# Patient Record
Sex: MALE | Race: BLACK OR AFRICAN AMERICAN | Employment: UNEMPLOYED | URBAN - METROPOLITAN AREA
[De-identification: names, ages, dates, MRNs, and addresses within clinical notes are randomized per-mention and may not be internally consistent; named-entity substitution may affect disease eponyms.]

---
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
## 2022-02-22 ENCOUNTER — HOSPITAL ENCOUNTER (EMERGENCY)
Age: 22
Discharge: HOME | End: 2022-02-22
Attending: EMERGENCY MEDICINE
Payer: COMMERCIAL

## 2022-02-22 ENCOUNTER — APPOINTMENT (OUTPATIENT)
Dept: GENERAL RADIOLOGY | Age: 22
End: 2022-02-22
Attending: EMERGENCY MEDICINE
Payer: COMMERCIAL

## 2022-02-22 VITALS
HEART RATE: 81 BPM | RESPIRATION RATE: 14 BRPM | OXYGEN SATURATION: 97 % | WEIGHT: 140 LBS | BODY MASS INDEX: 23.9 KG/M2 | TEMPERATURE: 98.5 F | DIASTOLIC BLOOD PRESSURE: 70 MMHG | SYSTOLIC BLOOD PRESSURE: 115 MMHG | HEIGHT: 64 IN

## 2022-02-22 DIAGNOSIS — F41.1 ANXIETY STATE: ICD-10-CM

## 2022-02-22 DIAGNOSIS — R00.2 PALPITATIONS: Primary | ICD-10-CM

## 2022-02-22 DIAGNOSIS — R06.02 SOB (SHORTNESS OF BREATH): ICD-10-CM

## 2022-02-22 DIAGNOSIS — R07.89 ATYPICAL CHEST PAIN: ICD-10-CM

## 2022-02-22 PROCEDURE — 71045 X-RAY EXAM CHEST 1 VIEW: CPT

## 2022-02-22 PROCEDURE — 99283 EMERGENCY DEPT VISIT LOW MDM: CPT

## 2022-02-22 PROCEDURE — 93005 ELECTROCARDIOGRAM TRACING: CPT

## 2022-02-22 NOTE — ED TRIAGE NOTES
Patient presents to the ED with c/o intermittent rapid heart rate and chest tightness x \"awhile. \" denies it currently. Pt also complains of lower back pain x a couple days. Denies injury. Stated \"its sometimes hard to push out urine. \" denies kidney issues. LBM was PTA. Denies abdominal pain. Denies N/V/D. Emergency Department Nursing Plan of Care       The Nursing Plan of Care is developed from the Nursing assessment and Emergency Department Attending provider initial evaluation. The plan of care may be reviewed in the ED Provider note.     The Plan of Care was developed with the following considerations:   Patient / Family readiness to learn indicated by:verbalized understanding  Persons(s) to be included in education: patient  Barriers to Learning/Limitations:No    Signed     Lonnie Love    2/22/2022   2:35 AM

## 2022-02-22 NOTE — ED PROVIDER NOTES
EMERGENCY DEPARTMENT HISTORY AND PHYSICAL EXAM      Date: 2022  Patient Name: Marion Concepcion    History of Presenting Illness     Chief Complaint   Patient presents with    Rapid Heart Rate    Back Pain       History Provided By: Patient    HPI: Marion Concepcion, 23 y.o. male with history of anxiety and depression presents to the ED with cc of multiple complaints. Patient initially complaining of palpitations and chest tightness that onset about an hour prior to arrival.  It started while he was playing video games at home and persisted until he came to the emergency department at which time it resolved. He also endorses a bleeding generalized abdominal discomfort and states that he feels like he is constipated even though he has had multiple bowel movements today. He endorses a sensation of shortness of breath and a pain in his lower back. Also endorses concerned that symptoms is difficult for him to urinate but he has not had any difficulty today. He does have history of anxiety and does not follow with anybody regarding this or take any medications. He states that he has tried to focus on his breathing. He does not believe that his symptoms today are consistent with anxiety. He denies any alcohol use, drug use specifically cocaine, or marijuana use. There are no other complaints, changes, or physical findings at this time. PCP: None    No current facility-administered medications on file prior to encounter. No current outpatient medications on file prior to encounter.        Past History     Past Medical History:  Past Medical History:   Diagnosis Date    ADHD (attention deficit hyperactivity disorder) 2019    Depression     Second hand smoke exposure        Past Surgical History:  Past Surgical History:   Procedure Laterality Date    HX CIRCUMCISION          HX OTHER SURGICAL      Laceration repair, left leg, [facility], 10 yrs old       Family History:  Family History   Problem Relation Age of Onset    Anxiety Mother     Depression Mother     Seizures Father     Diabetes Paternal Grandmother     Hypertension Paternal Grandmother     Hypertension Paternal Great Grandmother     Depression Maternal Grandmother     No Known Problems Brother     Heart Disease Maternal Grandfather     Emphysema Paternal Grandfather        Social History:  Social History     Tobacco Use    Smoking status: Passive Smoke Exposure - Never Smoker    Smokeless tobacco: Never Used   Substance Use Topics    Alcohol use: Never    Drug use: Never       Allergies:  No Known Allergies      Review of Systems   Review of Systems   Constitutional: Negative for chills and fever. Eyes: Negative for visual disturbance. Respiratory: Positive for chest tightness and shortness of breath. Negative for cough. Cardiovascular: Positive for chest pain and palpitations. Negative for leg swelling. Gastrointestinal: Positive for abdominal pain. Negative for constipation, diarrhea, nausea and vomiting. Genitourinary: Negative for dysuria. Musculoskeletal: Positive for back pain. Negative for gait problem. Skin: Negative for color change and rash. Neurological: Negative for dizziness, weakness, light-headedness and headaches. Psychiatric/Behavioral: The patient is nervous/anxious. All other systems reviewed and are negative. Physical Exam   Physical Exam  Vitals and nursing note reviewed. Constitutional:       General: He is not in acute distress. Appearance: Normal appearance. He is not ill-appearing or toxic-appearing. Comments: Sitting on side of bed in no acute distress. Ambulatory in ED without difficulty. HENT:      Head: Normocephalic and atraumatic. Nose: Nose normal.      Mouth/Throat:      Mouth: Mucous membranes are moist.   Eyes:      Extraocular Movements: Extraocular movements intact. Pupils: Pupils are equal, round, and reactive to light.    Cardiovascular: Rate and Rhythm: Normal rate and regular rhythm. Heart sounds: No murmur heard. Pulmonary:      Effort: Pulmonary effort is normal. No respiratory distress. Breath sounds: Normal breath sounds. No wheezing. Abdominal:      General: There is no distension. Palpations: Abdomen is soft. Tenderness: There is no abdominal tenderness. There is no guarding or rebound. Musculoskeletal:         General: No swelling or tenderness. Normal range of motion. Cervical back: Normal range of motion and neck supple. Right lower leg: No edema. Left lower leg: No edema. Skin:     General: Skin is warm and dry. Coloration: Skin is not pale. Findings: No erythema. Neurological:      General: No focal deficit present. Mental Status: He is alert and oriented to person, place, and time. Diagnostic Study Results     Labs -  Labs Reviewed   CBC WITH AUTOMATED DIFF   METABOLIC PANEL, COMPREHENSIVE   TSH 3RD GENERATION   D DIMER   TROPONIN-HIGH SENSITIVITY       Radiologic Studies -   XR CHEST PORT   Final Result      No acute process on portable chest. No change. CT Results  (Last 48 hours)    None        CXR Results  (Last 48 hours)               02/22/22 0310  XR CHEST PORT Final result    Impression:      No acute process on portable chest. No change. Narrative:  EXAM: XR CHEST PORT       INDICATION: Cardiac palpitations. COMPARISON: Portable chest on 12/5/2021 and 12/1/2021       TECHNIQUE: Upright portable chest AP view       FINDINGS: The cardiomediastinal and hilar contours are within normal limits. The   pulmonary vasculature is within normal limits. The lungs and pleural spaces are clear. The visualized bones and upper abdomen   are age-appropriate. Medical Decision Making   I am the first provider for this patient.     I reviewed the vital signs, available nursing notes, past medical history, past surgical history, family history and social history. Vital Signs-Reviewed the patient's vital signs. Visit Vitals  /70 (BP 1 Location: Right arm, BP Patient Position: At rest)   Pulse 81   Temp 98.5 °F (36.9 °C)   Resp 14   Ht 5' 4\" (1.626 m)   Wt 63.5 kg (140 lb)   SpO2 97%   BMI 24.03 kg/m²       Records Reviewed: Nursing Notes    Provider Notes (Medical Decision Making):   82-year-old male presenting to the emergency department with multiple complaints. Primary complaint is palpitations and chest tightness that onset earlier this evening while he was playing video games. He is afebrile and vital signs are stable without hypoxia or increased work of breathing. Exam is largely unremarkable. He also has complaints of generalized abdominal bloating, concern for constipation, back pain, and shortness of breath. He has multiple complaints and has been evaluated for these multiple complaints multiple ED encounters in the past few months that have been attributed to anxiety, however patient does not feel it is his anxiety today. Differential diagnosis includes anxiety, palpitations, arrhythmia, PE, pleurisy, constipation, IBS, colitis. Abdomen is soft, benign, nontender, and nonperitoneal and I do not feel he requires CT imaging at this time. Will correlate his symptoms with D-dimer, troponin, EKG, chest x-ray, TSH, CBC/BMP, and reassess. Offered Ativan which patient did not accept. ED Course:   Initial assessment performed. The patients presenting problems have been discussed, and they are in agreement with the care plan formulated and outlined with them. I have encouraged them to ask questions as they arise throughout their visit. ED Course as of 02/23/22 1051   Tue Feb 22, 2022   0324 EKG per my interpretation normal sinus rhythm, rate 92 bpm, normal axis, no acute ischemic changes or interval changes. Jeff Kaiser   [id number] Patient came out of exam room requesting to be discharged home.   I reevaluated patient and he states that he is no longer having symptoms would like to be discharged home. I discussed with him utility of laboratory evaluation and chest x-ray but patient does not want to stay for this part of the evaluation. He knows he can return at any time if symptoms are worsening, he is currently asymptomatic. Given strict return precautions and all questions answered. [AK]      ED Course User Index  [AK] Daksha Cuadra MD       Disposition:  Patient left ED prior to completion of workup. DISCHARGE PLAN:  1. There are no discharge medications for this patient. 2.   Follow-up Information     Follow up With Specialties Details Why [address] Internal Medicine Schedule an appointment as soon as possible for a visit  to establish with a PCP [place] [id number] [phone]    [facility] EMERGENCY DEPT Emergency Medicine Go to As needed, If symptoms worsen [address]        3. Return to ED if worse     Diagnosis     Clinical Impression:   1. Palpitations    2. Atypical chest pain    3. SOB (shortness of breath)    4. Anxiety state        Attestations:  I am the first and primary provider of record for this patient's ED encounter. I personally performed the services described above in this documentation. Traci Horton MD    Please note that this dictation was completed with [employer], the computer voice recognition software. Quite often unanticipated grammatical, syntax, homophones, and other interpretive errors are inadvertently transcribed by the computer software. Please disregard these errors. Please excuse any errors that have escaped final proofreading. Thank you.

## 2022-02-22 NOTE — ED NOTES
Pt informed this writer that he wanted to leave. MD Bert Burrows made aware. Pt not elaborating on why he wants to leave or why he came into ED. Bert Burrows attempted to inform pt on risks of leaving and discuss treatment plan with patient and pt's only response was that he wanted to go home. Ambulatory out of ED in [facility].

## 2022-02-23 LAB
ATRIAL RATE: 92 BPM
CALCULATED P AXIS, ECG09: 80 DEGREES
CALCULATED R AXIS, ECG10: 56 DEGREES
CALCULATED T AXIS, ECG11: 44 DEGREES
DIAGNOSIS, 93000: NORMAL
P-R INTERVAL, ECG05: 120 MS
Q-T INTERVAL, ECG07: 350 MS
QRS DURATION, ECG06: 84 MS
QTC CALCULATION (BEZET), ECG08: 432 MS
VENTRICULAR RATE, ECG03: 92 BPM

## 2023-02-17 ENCOUNTER — HOSPITAL ENCOUNTER (EMERGENCY)
Age: 23
Discharge: HOME | End: 2023-02-18
Attending: EMERGENCY MEDICINE
Payer: COMMERCIAL

## 2023-02-17 ENCOUNTER — APPOINTMENT (OUTPATIENT)
Dept: GENERAL RADIOLOGY | Age: 23
End: 2023-02-17
Attending: EMERGENCY MEDICINE
Payer: COMMERCIAL

## 2023-02-17 VITALS
OXYGEN SATURATION: 100 % | TEMPERATURE: 97.6 F | BODY MASS INDEX: 22.29 KG/M2 | SYSTOLIC BLOOD PRESSURE: 139 MMHG | WEIGHT: 142 LBS | DIASTOLIC BLOOD PRESSURE: 68 MMHG | HEIGHT: 67 IN | HEART RATE: 99 BPM | RESPIRATION RATE: 20 BRPM

## 2023-02-17 DIAGNOSIS — R07.9 ACUTE CHEST PAIN: Primary | ICD-10-CM

## 2023-02-17 DIAGNOSIS — F41.1 ANXIETY STATE: ICD-10-CM

## 2023-02-17 DIAGNOSIS — K21.9 GASTROESOPHAGEAL REFLUX DISEASE WITHOUT ESOPHAGITIS: ICD-10-CM

## 2023-02-17 DIAGNOSIS — R07.89 ATYPICAL CHEST PAIN: ICD-10-CM

## 2023-02-17 LAB
ATRIAL RATE: 79 BPM
ATRIAL RATE: 90 BPM
BASOPHILS # BLD: 0.1 K/UL (ref 0–0.1)
BASOPHILS NFR BLD: 2 % (ref 0–1)
CALCULATED P AXIS, ECG09: 105 DEGREES
CALCULATED P AXIS, ECG09: 44 DEGREES
CALCULATED R AXIS, ECG10: 57 DEGREES
CALCULATED R AXIS, ECG10: 78 DEGREES
CALCULATED T AXIS, ECG11: 3 DEGREES
CALCULATED T AXIS, ECG11: 55 DEGREES
COMMENT, HOLDF: NORMAL
DIAGNOSIS, 93000: NORMAL
DIAGNOSIS, 93000: NORMAL
DIFFERENTIAL METHOD BLD: ABNORMAL
EOSINOPHIL # BLD: 0.2 K/UL (ref 0–0.4)
EOSINOPHIL NFR BLD: 3 % (ref 0–7)
ERYTHROCYTE [DISTWIDTH] IN BLOOD BY AUTOMATED COUNT: 11.5 % (ref 11.5–14.5)
HCT VFR BLD AUTO: 43.2 % (ref 36.6–50.3)
HGB BLD-MCNC: 14.4 G/DL (ref 12.1–17)
IMM GRANULOCYTES # BLD AUTO: 0 K/UL (ref 0–0.04)
IMM GRANULOCYTES NFR BLD AUTO: 0 % (ref 0–0.5)
LYMPHOCYTES # BLD: 2.2 K/UL (ref 0.8–3.5)
LYMPHOCYTES NFR BLD: 37 % (ref 12–49)
MCH RBC QN AUTO: 31 PG (ref 26–34)
MCHC RBC AUTO-ENTMCNC: 33.3 G/DL (ref 30–36.5)
MCV RBC AUTO: 93.1 FL (ref 80–99)
MONOCYTES # BLD: 0.5 K/UL (ref 0–1)
MONOCYTES NFR BLD: 9 % (ref 5–13)
NEUTS SEG # BLD: 3.1 K/UL (ref 1.8–8)
NEUTS SEG NFR BLD: 49 % (ref 32–75)
NRBC # BLD: 0 K/UL (ref 0–0.01)
NRBC BLD-RTO: 0 PER 100 WBC
P-R INTERVAL, ECG05: 118 MS
P-R INTERVAL, ECG05: 48 MS
PLATELET # BLD AUTO: 283 K/UL (ref 150–400)
PMV BLD AUTO: 10 FL (ref 8.9–12.9)
Q-T INTERVAL, ECG07: 324 MS
Q-T INTERVAL, ECG07: 348 MS
QRS DURATION, ECG06: 138 MS
QRS DURATION, ECG06: 90 MS
QTC CALCULATION (BEZET), ECG08: 396 MS
QTC CALCULATION (BEZET), ECG08: 399 MS
RBC # BLD AUTO: 4.64 M/UL (ref 4.1–5.7)
SAMPLES BEING HELD,HOLD: NORMAL
VENTRICULAR RATE, ECG03: 79 BPM
VENTRICULAR RATE, ECG03: 90 BPM
WBC # BLD AUTO: 6.1 K/UL (ref 4.1–11.1)

## 2023-02-17 PROCEDURE — 80053 COMPREHEN METABOLIC PANEL: CPT

## 2023-02-17 PROCEDURE — 80307 DRUG TEST PRSMV CHEM ANLYZR: CPT

## 2023-02-17 PROCEDURE — 93005 ELECTROCARDIOGRAM TRACING: CPT

## 2023-02-17 PROCEDURE — 74011250636 HC RX REV CODE- 250/636: Performed by: EMERGENCY MEDICINE

## 2023-02-17 PROCEDURE — 71045 X-RAY EXAM CHEST 1 VIEW: CPT

## 2023-02-17 PROCEDURE — 84484 ASSAY OF TROPONIN QUANT: CPT

## 2023-02-17 PROCEDURE — 99285 EMERGENCY DEPT VISIT HI MDM: CPT

## 2023-02-17 PROCEDURE — 83690 ASSAY OF LIPASE: CPT

## 2023-02-17 PROCEDURE — 85025 COMPLETE CBC W/AUTO DIFF WBC: CPT

## 2023-02-17 PROCEDURE — 96374 THER/PROPH/DIAG INJ IV PUSH: CPT

## 2023-02-17 RX ORDER — KETOROLAC TROMETHAMINE 30 MG/ML
15 INJECTION, SOLUTION INTRAMUSCULAR; INTRAVENOUS
Status: COMPLETED | OUTPATIENT
Start: 2023-02-17 | End: 2023-02-17

## 2023-02-17 RX ADMIN — KETOROLAC TROMETHAMINE 15 MG: 30 INJECTION, SOLUTION INTRAMUSCULAR; INTRAVENOUS at 23:44

## 2023-02-18 LAB
ALBUMIN SERPL-MCNC: 4.4 G/DL (ref 3.5–5)
ALBUMIN/GLOB SERPL: 1.2 (ref 1.1–2.2)
ALP SERPL-CCNC: 65 U/L (ref 45–117)
ALT SERPL-CCNC: 13 U/L (ref 12–78)
AMPHET UR QL SCN: NEGATIVE
ANION GAP SERPL CALC-SCNC: 8 MMOL/L (ref 5–15)
AST SERPL-CCNC: 12 U/L (ref 15–37)
BARBITURATES UR QL SCN: NEGATIVE
BENZODIAZ UR QL: NEGATIVE
BILIRUB SERPL-MCNC: 0.5 MG/DL (ref 0.2–1)
BUN SERPL-MCNC: 11 MG/DL (ref 6–20)
BUN/CREAT SERPL: 10 (ref 12–20)
CALCIUM SERPL-MCNC: 9 MG/DL (ref 8.5–10.1)
CANNABINOIDS UR QL SCN: NEGATIVE
CHLORIDE SERPL-SCNC: 102 MMOL/L (ref 97–108)
CO2 SERPL-SCNC: 29 MMOL/L (ref 21–32)
COCAINE UR QL SCN: NEGATIVE
CREAT SERPL-MCNC: 1.1 MG/DL (ref 0.7–1.3)
DRUG SCRN COMMENT,DRGCM: NORMAL
GLOBULIN SER CALC-MCNC: 3.6 G/DL (ref 2–4)
GLUCOSE SERPL-MCNC: 92 MG/DL (ref 65–100)
LIPASE SERPL-CCNC: 64 U/L (ref 73–393)
METHADONE UR QL: NEGATIVE
OPIATES UR QL: NEGATIVE
PCP UR QL: NEGATIVE
POTASSIUM SERPL-SCNC: 4.1 MMOL/L (ref 3.5–5.1)
PROT SERPL-MCNC: 8 G/DL (ref 6.4–8.2)
SODIUM SERPL-SCNC: 139 MMOL/L (ref 136–145)
TROPONIN I SERPL HS-MCNC: <4 NG/L (ref 0–76)

## 2023-02-18 RX ORDER — HYDROXYZINE 25 MG/1
25 TABLET, FILM COATED ORAL
Qty: 10 TABLET | Refills: 0 | Status: SHIPPED | OUTPATIENT
Start: 2023-02-18 | End: 2023-02-28

## 2023-02-18 RX ORDER — FAMOTIDINE 20 MG/1
20 TABLET, FILM COATED ORAL 2 TIMES DAILY
Qty: 20 TABLET | Refills: 0 | Status: SHIPPED | OUTPATIENT
Start: 2023-02-18 | End: 2023-02-28

## 2023-02-18 RX ORDER — NAPROXEN 500 MG/1
500 TABLET ORAL 2 TIMES DAILY WITH MEALS
Qty: 20 TABLET | Refills: 0 | Status: SHIPPED | OUTPATIENT
Start: 2023-02-18

## 2023-02-18 NOTE — DISCHARGE INSTRUCTIONS
Thank You! It was a pleasure taking care of you in our Emergency Department today. We know that when you come to [address] [place] [address], you are entrusting us with your health, comfort, and safety. Our physicians and nurses honor that trust, and truly appreciate the opportunity to care for you and your loved ones. We also value your feedback. If you receive a survey about your Emergency Department experience today, please fill it out. We care about our patients' feedback, and we listen to what you have to say. Thank you. Dr. Katharina Castro M.D.      ____________________________________________________________________  I have included a copy of your lab results and/or radiologic studies from today's visit so you can have them easily available at your follow-up visit. We hope you feel better and please do not hesitate to contact the ED if you have any questions at all! Recent Results (from the past 12 hour(s))   EKG, 12 LEAD, INITIAL    Collection Time: 02/17/23 11:17 PM   Result Value Ref Range    Ventricular Rate 90 BPM    Atrial Rate 90 BPM    P-R Interval 118 ms    QRS Duration 90 ms    Q-T Interval 324 ms    QTC Calculation (Bezet) 396 ms    Calculated P Axis 105 degrees    Calculated R Axis 57 degrees    Calculated T Axis 3 degrees    Diagnosis       Normal sinus rhythm with sinus arrhythmia  Cannot rule out Inferior infarct , age undetermined  Lateral injury pattern  ** ** ACUTE MI / STEMI ** **  Abnormal ECG  When compared with ECG of 22-FEB-2022 03:17,  Minimal criteria for Inferior infarct are now present  ST now depressed in Inferior leads  ST no longer elevated in Lateral leads     SAMPLES BEING HELD    Collection Time: 02/17/23 11:35 PM   Result Value Ref Range    SAMPLES BEING HELD BLUE     COMMENT        Add-on orders for these samples will be processed based on acceptable specimen integrity and analyte stability, which may vary by analyte.    CBC WITH AUTOMATED DIFF Collection Time: 02/17/23 11:35 PM   Result Value Ref Range    WBC 6.1 4.1 - 11.1 K/uL    RBC 4.64 4.10 - 5.70 M/uL    HGB 14.4 12.1 - 17.0 g/dL    HCT 43.2 36.6 - 50.3 %    MCV 93.1 80.0 - 99.0 FL    MCH 31.0 26.0 - 34.0 PG    MCHC 33.3 30.0 - 36.5 g/dL    RDW 11.5 11.5 - 14.5 %    PLATELET 741 312 - 757 K/uL    MPV 10.0 8.9 - 12.9 FL    NRBC 0.0 0  WBC    ABSOLUTE NRBC 0.00 0.00 - 0.01 K/uL    NEUTROPHILS 49 32 - 75 %    LYMPHOCYTES 37 12 - 49 %    MONOCYTES 9 5 - 13 %    EOSINOPHILS 3 0 - 7 %    BASOPHILS 2 (H) 0 - 1 %    IMMATURE GRANULOCYTES 0 0.0 - 0.5 %    ABS. NEUTROPHILS 3.1 1.8 - 8.0 K/UL    ABS. LYMPHOCYTES 2.2 0.8 - 3.5 K/UL    ABS. MONOCYTES 0.5 0.0 - 1.0 K/UL    ABS. EOSINOPHILS 0.2 0.0 - 0.4 K/UL    ABS. BASOPHILS 0.1 0.0 - 0.1 K/UL    ABS. IMM. GRANS. 0.0 0.00 - 0.04 K/UL    DF AUTOMATED     METABOLIC PANEL, COMPREHENSIVE    Collection Time: 02/17/23 11:35 PM   Result Value Ref Range    Sodium 139 136 - 145 mmol/L    Potassium 4.1 3.5 - 5.1 mmol/L    Chloride 102 97 - 108 mmol/L    CO2 29 21 - 32 mmol/L    Anion gap 8 5 - 15 mmol/L    Glucose 92 65 - 100 mg/dL    BUN 11 6 - 20 MG/DL    Creatinine 1.10 0.70 - 1.30 MG/DL    BUN/Creatinine ratio 10 (L) 12 - 20      eGFR >60 >60 ml/min/1.73m2    Calcium 9.0 8.5 - 10.1 MG/DL    Bilirubin, total 0.5 0.2 - 1.0 MG/DL    ALT (SGPT) 13 12 - 78 U/L    AST (SGOT) 12 (L) 15 - 37 U/L    Alk.  phosphatase 65 45 - 117 U/L    Protein, total 8.0 6.4 - 8.2 g/dL    Albumin 4.4 3.5 - 5.0 g/dL    Globulin 3.6 2.0 - 4.0 g/dL    A-G Ratio 1.2 1.1 - 2.2     LIPASE    Collection Time: 02/17/23 11:35 PM   Result Value Ref Range    Lipase 64 (L) 73 - 393 U/L   TROPONIN-HIGH SENSITIVITY    Collection Time: 02/17/23 11:35 PM   Result Value Ref Range    Troponin-High Sensitivity <4 0 - 76 ng/L   EKG, 12 LEAD, SUBSEQUENT    Collection Time: 02/17/23 11:56 PM   Result Value Ref Range    Ventricular Rate 79 BPM    Atrial Rate 79 BPM    P-R Interval 48 ms    QRS Duration 138 ms Q-T Interval 348 ms    QTC Calculation (Bezet) 399 ms    Calculated P Axis 44 degrees    Calculated R Axis 78 degrees    Calculated T Axis 55 degrees    Diagnosis       Sinus rhythm with sinus arrhythmia with short MA  Nonspecific intraventricular block  Abnormal ECG  When compared with ECG of 17-FEB-2023 23:17,  MANUAL COMPARISON REQUIRED, DATA IS UNCONFIRMED         XR CHEST PORT   Final Result      No acute process on portable chest.           CT Results  (Last 48 hours)      None          The exam and treatment you received in the Emergency Department were for an urgent problem and are not intended as complete care. It is important that you follow up with a doctor, nurse practitioner, or physician assistant for ongoing care. If your symptoms become worse or you do not improve as expected and you are unable to reach your usual health care provider, you should return to the Emergency Department. We are available 24 hours a day. Please take your discharge instructions with you when you go to your follow-up appointment. If a prescription has been provided, please have it filled as soon as possible to prevent a delay in treatment. Read the entire medication instruction sheet provided to you by the pharmacy. If you have any questions or reservations about taking the medication due to side effects or interactions with other medications, please call your primary care physician or contact the ER to speak with the charge nurse. Please make an appointment with your family doctor or the physician you were referred to for follow-up of this visit as instructed on your discharge paperwork. Return to the ER if you are unable to be seen or if you are unable to be seen in a timely manner. If you have any problem arranging the follow-up visit, contact the Emergency Department immediately.

## 2023-02-18 NOTE — ED PROVIDER NOTES
EMERGENCY DEPARTMENT HISTORY AND PHYSICAL EXAM            Please note that this dictation was completed with the assistance of \"Dragon\", the computer voice recognition software. Quite often unanticipated grammatical, syntax, homophones, and other interpretive errors are inadvertently transcribed by the computer software. Please disregard these errors and any errors that have escaped final proofreading. Thank you. Date of Evaluation: 23  Patient: Angela Concepcion  Patient Age and Sex: 26 y.o. male   MRN: [id number]  CSN: [id number]  PCP: None    History of Present Illness     Chief Complaint   Patient presents with    Chest Pain     History Provided By: Patient/family/EMS (if available)    HPI Limitations : None    HPI: Lowell DEMARCO Page, 26 y.o. male with past medical history as documented below presents to the ED with c/o of 3 days of mild to moderate chest tightness with associated mild headache and shortness of breath. Patient reports that he was diagnosed with anemia many years ago but never received any blood transfusions but denies any trauma. Patient is taking no medications yet for symptoms. . Pt denies any other exacerbating or ameliorating factors. There are no other complaints, changes or physical findings pertinent to the HPI at this time. Nursing Notes were all reviewed and agreed with or any disagreements were addressed in the HPI.     Past History   Past Medical History:  Past Medical History:   Diagnosis Date    ADHD (attention deficit hyperactivity disorder) 2019    Depression     Second hand smoke exposure        Past Surgical History:  Past Surgical History:   Procedure Laterality Date    HX CIRCUMCISION      [place]    HX OTHER SURGICAL      Laceration repair, left leg, [facility], 10 yrs old       Family History:   Family history reviewed and was non-contributory, unless specified below:  Family History   Problem Relation Age of Onset    Anxiety Mother     Depression Mother     Seizures Father     Diabetes Paternal Grandmother     Hypertension Paternal Grandmother     Hypertension Paternal Great Grandmother     Depression Maternal Grandmother     No Known Problems Brother     Heart Disease Maternal Grandfather     Emphysema Paternal Grandfather        Social History:  Social History     Tobacco Use    Smoking status: Passive Smoke Exposure - Never Smoker    Smokeless tobacco: Never   Substance Use Topics    Alcohol use: Never    Drug use: Never       Allergies:  No Known Allergies    Current Medications:  No current facility-administered medications on file prior to encounter. No current outpatient medications on file prior to encounter. Review of Systems   A complete ROS was reviewed by me today and all other systems negative, unless otherwise specified below:  Review of Systems   Constitutional: Negative. Negative for chills and fever. HENT: Negative. Negative for congestion and sore throat. Eyes: Negative. Respiratory: Negative. Negative for cough, chest tightness, shortness of breath and wheezing. Cardiovascular:  Positive for chest pain. Negative for palpitations and leg swelling. Gastrointestinal:  Positive for diarrhea. Negative for abdominal distention, abdominal pain, blood in stool, constipation, nausea and vomiting. Endocrine: Negative. Genitourinary: Negative. Negative for difficulty urinating, dysuria, flank pain, frequency, hematuria and urgency. Musculoskeletal: Negative. Negative for back pain and neck stiffness. Skin: Negative. Negative for rash. Allergic/Immunologic: Negative. Neurological:  Positive for headaches. Negative for dizziness, syncope, weakness, light-headedness and numbness. Hematological: Negative. Psychiatric/Behavioral: Negative. Negative for confusion and self-injury.     Physical Exam   Patient Vitals for the past 24 hrs:   Temp Pulse Resp BP SpO2   02/17/23 2304 97.6 °F (36.4 °C) 99 20 139/68 100 %       Physical Exam  Vitals and nursing note reviewed. Constitutional:       Appearance: He is well-developed. He is not toxic-appearing. HENT:      Head: Normocephalic and atraumatic. Mouth/Throat:      Pharynx: No posterior oropharyngeal erythema. Eyes:      Conjunctiva/sclera: Conjunctivae normal.      Pupils: Pupils are equal, round, and reactive to light. Cardiovascular:      Rate and Rhythm: Normal rate and regular rhythm. Heart sounds: Normal heart sounds. No murmur heard. No friction rub. No gallop. Pulmonary:      Effort: Pulmonary effort is normal. No respiratory distress. Breath sounds: Normal breath sounds. No wheezing or rales. Chest:      Chest wall: Tenderness (Reproducible chest wall tenderness, no rash noted, equal pulses bilateral extremity, symmetric blood pressures in both arms.) present. Abdominal:      General: Bowel sounds are normal. There is no distension. Palpations: Abdomen is soft. There is no mass. Tenderness: There is no abdominal tenderness. There is no guarding or rebound. Musculoskeletal:         General: No tenderness or deformity. Normal range of motion. Cervical back: Normal range of motion. Skin:     General: Skin is warm. Findings: No rash. Neurological:      Mental Status: He is alert and oriented to person, place, and time. Cranial Nerves: No cranial nerve deficit. Motor: No abnormal muscle tone. Coordination: Coordination normal.      Deep Tendon Reflexes: Reflexes normal.   Psychiatric:         Behavior: Behavior is cooperative. Diagnostic Studies     LABORATORY RESULTS:  I have personally reviewed and interpreted all available laboratory results.    Recent Results (from the past 24 hour(s))   EKG, 12 LEAD, INITIAL    Collection Time: 02/17/23 11:17 PM   Result Value Ref Range    Ventricular Rate 90 BPM    Atrial Rate 90 BPM    P-R Interval 118 ms    QRS Duration 90 ms    Q-T Interval 324 ms    QTC Calculation (Bezet) 396 ms    Calculated P Axis 105 degrees    Calculated R Axis 57 degrees    Calculated T Axis 3 degrees    Diagnosis       Normal sinus rhythm with sinus arrhythmia  Cannot rule out Inferior infarct , age undetermined  Lateral injury pattern  ** ** ACUTE MI / STEMI ** **  Abnormal ECG  When compared with ECG of 22-FEB-2022 03:17,  Minimal criteria for Inferior infarct are now present  ST now depressed in Inferior leads  ST no longer elevated in Lateral leads     SAMPLES BEING HELD    Collection Time: 02/17/23 11:35 PM   Result Value Ref Range    SAMPLES BEING HELD BLUE     COMMENT        Add-on orders for these samples will be processed based on acceptable specimen integrity and analyte stability, which may vary by analyte. CBC WITH AUTOMATED DIFF    Collection Time: 02/17/23 11:35 PM   Result Value Ref Range    WBC 6.1 4.1 - 11.1 K/uL    RBC 4.64 4.10 - 5.70 M/uL    HGB 14.4 12.1 - 17.0 g/dL    HCT 43.2 36.6 - 50.3 %    MCV 93.1 80.0 - 99.0 FL    MCH 31.0 26.0 - 34.0 PG    MCHC 33.3 30.0 - 36.5 g/dL    RDW 11.5 11.5 - 14.5 %    PLATELET 440 442 - 926 K/uL    MPV 10.0 8.9 - 12.9 FL    NRBC 0.0 0  WBC    ABSOLUTE NRBC 0.00 0.00 - 0.01 K/uL    NEUTROPHILS 49 32 - 75 %    LYMPHOCYTES 37 12 - 49 %    MONOCYTES 9 5 - 13 %    EOSINOPHILS 3 0 - 7 %    BASOPHILS 2 (H) 0 - 1 %    IMMATURE GRANULOCYTES 0 0.0 - 0.5 %    ABS. NEUTROPHILS 3.1 1.8 - 8.0 K/UL    ABS. LYMPHOCYTES 2.2 0.8 - 3.5 K/UL    ABS. MONOCYTES 0.5 0.0 - 1.0 K/UL    ABS. EOSINOPHILS 0.2 0.0 - 0.4 K/UL    ABS. BASOPHILS 0.1 0.0 - 0.1 K/UL    ABS. IMM.  GRANS. 0.0 0.00 - 0.04 K/UL    DF AUTOMATED     METABOLIC PANEL, COMPREHENSIVE    Collection Time: 02/17/23 11:35 PM   Result Value Ref Range    Sodium 139 136 - 145 mmol/L    Potassium 4.1 3.5 - 5.1 mmol/L    Chloride 102 97 - 108 mmol/L    CO2 29 21 - 32 mmol/L    Anion gap 8 5 - 15 mmol/L    Glucose 92 65 - 100 mg/dL    BUN 11 6 - 20 MG/DL    Creatinine 1.10 0.70 - 1.30 MG/DL    BUN/Creatinine ratio 10 (L) 12 - 20      eGFR >60 >60 ml/min/1.73m2    Calcium 9.0 8.5 - 10.1 MG/DL    Bilirubin, total 0.5 0.2 - 1.0 MG/DL    ALT (SGPT) 13 12 - 78 U/L    AST (SGOT) 12 (L) 15 - 37 U/L    Alk. phosphatase 65 45 - 117 U/L    Protein, total 8.0 6.4 - 8.2 g/dL    Albumin 4.4 3.5 - 5.0 g/dL    Globulin 3.6 2.0 - 4.0 g/dL    A-G Ratio 1.2 1.1 - 2.2     LIPASE    Collection Time: 02/17/23 11:35 PM   Result Value Ref Range    Lipase 64 (L) 73 - 393 U/L   TROPONIN-HIGH SENSITIVITY    Collection Time: 02/17/23 11:35 PM   Result Value Ref Range    Troponin-High Sensitivity <4 0 - 76 ng/L   DRUG SCREEN, URINE    Collection Time: 02/17/23 11:45 PM   Result Value Ref Range    AMPHETAMINES Negative NEG      BARBITURATES Negative NEG      BENZODIAZEPINES Negative NEG      COCAINE Negative NEG      METHADONE Negative NEG      OPIATES Negative NEG      PCP(PHENCYCLIDINE) Negative NEG      THC (TH-CANNABINOL) Negative NEG      Drug screen comment (NOTE)    EKG, 12 LEAD, SUBSEQUENT    Collection Time: 02/17/23 11:56 PM   Result Value Ref Range    Ventricular Rate 79 BPM    Atrial Rate 79 BPM    P-R Interval 48 ms    QRS Duration 138 ms    Q-T Interval 348 ms    QTC Calculation (Bezet) 399 ms    Calculated P Axis 44 degrees    Calculated R Axis 78 degrees    Calculated T Axis 55 degrees    Diagnosis       Sinus rhythm with sinus arrhythmia with short TN  Nonspecific intraventricular block  Abnormal ECG  When compared with ECG of 17-FEB-2023 23:17,  MANUAL COMPARISON REQUIRED, DATA IS UNCONFIRMED         RADIOLOGY RESULTS:  I have personally reviewed and interpreted all available imaging studies and agree with radiology interpretation.   XR CHEST PORT   Final Result      No acute process on portable chest.           CT Results  (Last 48 hours)      None          CXR Results  (Last 48 hours)                 02/17/23 2341  XR CHEST PORT Final result    Impression:      No acute process on portable chest.           Narrative:  EXAM:  XR CHEST PORT       INDICATION: Chest pain       COMPARISON: CXR 2/22/2022       TECHNIQUE: Upright portable chest AP view       FINDINGS: The cardiac silhouette is within normal limits. The pulmonary   vasculature is within normal limits. The lungs and pleural spaces are clear. The visualized bones and upper abdomen   are age-appropriate. XR CHEST PORT   Final Result      No acute process on portable chest.              MEDICAL DECISION MAKING   I am the first and primary ED physician for this patient's ED visit today. I reviewed our EMR for any past records that may contribute to the patient's current condition, including their past medical, surgical, social and family history. This also includes their most recent ED visits, previous hospitalizations and prior diagnostic data. I have reviewed and summarized the most pertinent findings in my HPI and MDM. Vital Signs Reviewed:  Patient Vitals for the past 24 hrs:   Temp Pulse Resp BP SpO2   02/17/23 2304 97.6 °F (36.4 °C) 99 20 139/68 100 %     Pulse Oximetry Analysis: 100% on RA with good pleth    Cardiac Monitor:   Rate: 99 bpm  The cardiac monitor revealed the following rhythm as interpreted by me: Normal Sinus Rhythm  Cardiac monitoring was ordered to monitor patient for signs of cardiac dysrhythmia, which they are at risk for based on their history and/or risk for cardiovascular disease and/or metabolic abnormalities. EKG interpretation:   Billable EKG reviewed by ED Physician in the absence of a cardiologist: Yes  Rhythm: normal sinus rhythm; and regular .  Rate (approx.): 90; Axis: normal; P wave: normal; QRS interval: normal ; ST/T wave: normal; Other findings: normal. This EKG was interpreted by Lawrence Paz MD     Records Reviewed: Nursing Notes, Old Medical Records, Previous electrocardiograms, Previous Radiology Studies and Previous Laboratory Studies, EMS reports    DIFFERENTIAL DIAGNOSIS AND MDM:  DDx includes STEMI, NSTEMI, Angina, PE, Aortic Pathology, Chest Wall Pain, Pleurisy, Pneumonia, GERD/esophagitis, Anxiety. No cough/fever or focal lung findings to suggest pneumonia. No tachycardia, hypoxia or pleuritic component to suggest PE. Pulses symmetric and no extremely elevated BP/asymmetry or classic tearing sensation to suggest Aortic Dissection. Also, no neuro findings. No wretching/forceful vomiting to suggest esophageal disaster. Denies IV drug abuse, has native valves, no fevers/murmurs or skin lesions to suggest endocarditis. Will evaluate with EKG, labs, cardiac enzymes, chest x-ray. Will provide pain control and reassess. Pt presents with acute headache; afebrile with stable vitals; exam is without focal deficits. DDx: migraine, tension headache, cluster HA, stress, hypertensive urgency, dehydration. HA was gradual onset, pt neuro intact, no nausea/vomiting/focal weakness/sensory change to suggest CVA or ICH. Also pt is not immunocompromised, no fever, no focal weakness to suggest brain abscess. Therefore, no CT head. No neck stiffness, fever, AMS, photophobia to suggest meningitis. Neg kernig and Brudzinski. Therefore no LP. No jaw claudication, visual changes or temporal pain to suggest temporal arteritis, therefore no ESR/CRP. No eye pain, PERRL, no red eye to suggest glaucoma. No risk factors for CO. Will treat pain and reassess. Review of Prior Records and External Documents:   reviewed: YES - I have reviewed the patient's controlled substance prescription history thru the Prescription Monitoring Program so that the prescription(s) for a controlled substance can be given. Prior hospital discharge summaries and clinic notes reviewed: Reviewed pediatric note from May 12, 2020 when patient was seen by his pediatrician for anxiety and constipation. ED Course: Progress Notes, Reevaluation, and Consults:  Initial assessment performed.  I discussed presenting problems and concerns, and my formulated plan for today's visit with the patient and any available family members. I have encouraged them to ask questions as they arise throughout the visit. ED Physician Orders:   Orders Placed This Encounter    XR CHEST PORT    Hold Sample    CBC WITH AUTOMATED DIFF    METABOLIC PANEL, COMPREHENSIVE    LIPASE    TROPONIN-HIGH SENSITIVITY    DRUG SCREEN, URINE    EKG 12 LEAD INITIAL    EKG, 12 LEAD, SUBSEQUENT    ketorolac (TORADOL) injection 15 mg    naproxen (NAPROSYN) 500 mg tablet    famotidine (Pepcid) 20 mg tablet    hydrOXYzine HCL (ATARAX) 25 mg tablet     ED Medications Given:   Medications   ketorolac (TORADOL) injection 15 mg (15 mg IntraVENous Given 2/17/23 2344)     ED Physician Interpretation of Test Results: All results were independently reviewed and interpreted by myself, notably showing:     RADIOLOGY:  Non-plain film images such as CT, ultrasound and MRI are read by the radiologist. Burnis Gonzales radiographic images are visualized and preliminarily interpreted by the ED Provider with the below findings:     Imaging interpreted by me:     Interpretation per the Radiologist below, if available at the time of this note:     XR CHEST PORT   Final Result      No acute process on portable chest.           CT Results  (Last 48 hours)      None          CXR Results  (Last 48 hours)                 02/17/23 2341  XR CHEST PORT Final result    Impression:      No acute process on portable chest.           Narrative:  EXAM:  XR CHEST PORT       INDICATION: Chest pain       COMPARISON: CXR 2/22/2022       TECHNIQUE: Upright portable chest AP view       FINDINGS: The cardiac silhouette is within normal limits. The pulmonary   vasculature is within normal limits. The lungs and pleural spaces are clear. The visualized bones and upper abdomen   are age-appropriate. XR CHEST PORT   Final Result      No acute process on portable chest.           My interpretation of EKG: No STEMI.   See my EKG interpretation in ED course above. My interpretation of laboratory results: CBC without leukocytosis, hemoglobin stable, CMP unremarkable, troponin negative. Lipase negative. Progress Note:  I have just re-evaluated the patient. Pt reports improvement of his symptoms. I have reviewed his vital signs and determined there is currently no worsening in their condition or physical exam. Results have been reviewed with them and their questions have been answered. I will continue to review further results as they come available. Amount and/or Complexity of Data Reviewed  Medical Complexity: HIGH  Presentation: ACUTE and SEVERE  Clinical lab tests: ordered as appropriate & reviewed  Tests in the radiology section of CPT®: ordered as appropriate & reviewed  Tests in the medicine section of CPT®: ordered as appropriate & reviewed  Obtain history from someone other than the patient: yes  Review and summarize past medical records: yes  Independent visualization of images, tracings, or specimens: yes    Risks  OTC drugs. Prescription drug management. Shared Decision Making: I considered performing CTA imaging but did not after shared decision making with patient due to pt being PERC negative and clinical picture not consistent with dissection or pulmonary embolus as discussed above in MDM. DISCHARGE  Pt reassessed and symptoms noted to have improved significantly after ED treatment. Viktoriyaon NILAM Concepcion's labs and imaging have been reviewed with him and available family. He verbally conveys understanding and agreement of the signs, symptoms, diagnosis, treatment and prognosis and additionally agrees to follow up as recommended with Dr. None and/or specialist as instructed. He agrees with the care plan we have created and conveys that all of his questions have been answered.  Additionally, I have put together a packet of discharge instructions for him that include: 1) Educational information regarding their diagnosis, 2) How to care for their diagnosis at home, as well a 3) List of reasons why they would want to return to the ED prior to their follow-up appointment should their condition change or symptoms worsen. I have answered all questions to the patient's satisfaction. Strict return precautions given. He and/or family conveyed understanding and agreement with care plan. Vital signs stable for discharge. PLAN  1. Return precautions as discussed with patient and available family/caregiver. 2.   Discharge Medication List as of 2/18/2023 12:15 AM      No current facility-administered medications for this encounter. Current Outpatient Medications:     naproxen (NAPROSYN) 500 mg tablet, Take 1 Tablet by mouth two (2) times daily (with meals). , Disp: 20 Tablet, Rfl: 0    famotidine (Pepcid) 20 mg tablet, Take 1 Tablet by mouth two (2) times a day for 10 days. , Disp: 20 Tablet, Rfl: 0    hydrOXYzine HCL (ATARAX) 25 mg tablet, Take 1 Tablet by mouth three (3) times daily as needed for Anxiety for up to 10 days. , Disp: 10 Tablet, Rfl: 0    3. Follow-up Information       Follow up With Specialties Details Why [address] [facility] EMERGENCY DEPT Emergency Medicine   [facility]  [phone]          Instructed to return to ED if worse    FINAL DIAGNOSIS   Clinical Impression:  1. Acute chest pain    2. Gastroesophageal reflux disease without esophagitis    3. Atypical chest pain    4. Anxiety state      Attestation:  I am the attending of record for this patient. I personally performed the services described in this documentation on this date, 2/17/2023 for patientJaisons Page. I have reviewed the chart and verified that the record is accurate and complete.       Jeanine Crawford MD (Electronic Signature)

## 2023-02-18 NOTE — ED TRIAGE NOTES
Pt comes in ambulatory reporting diarrhea for 3 days that has since resolved, HA x 3 days, and CP starting today with SOB. Pt denies hx asthma, but says he has been anemic.

## 2023-02-18 NOTE — ED NOTES
Pt presents ambulatory to ED reporting diarrhea for 3 days that has since resolved, HA x 3 days, and CP starting today with SOB. Pt denies hx asthma, but says he has been anemic. Pt appears anxious. Pt is alert and oriented x 4, RR even and unlabored, skin is warm and dry. Assesment completed and pt updated on plan of care. Emergency Department Nursing Plan of Care       The Nursing Plan of Care is developed from the Nursing assessment and Emergency Department Attending provider initial evaluation. The plan of care may be reviewed in the ED Provider note.     The Plan of Care was developed with the following considerations:   Patient / Family readiness to learn indicated by:verbalized understanding  Persons(s) to be included in education: patient  Barriers to Learning/Limitations:No    Signed     [address], RN    2/18/2023   12:12 AM

## 2023-02-18 NOTE — ED NOTES
Discharge instructions were given to the patient by Juliene Spatz, RN. The patient left the Emergency Department ambulatory, alert and oriented and in no acute distress with 3 prescriptions. The patient was encouraged to call or return to the ED for worsening issues or problems and was encouraged to schedule a follow up appointment for continuing care. The patient verbalized understanding of discharge instructions and prescriptions, all questions were answered. The patient has no further concerns at this time.

## 2023-02-20 LAB
ATRIAL RATE: 79 BPM
ATRIAL RATE: 90 BPM
CALCULATED P AXIS, ECG09: 105 DEGREES
CALCULATED P AXIS, ECG09: 44 DEGREES
CALCULATED R AXIS, ECG10: 57 DEGREES
CALCULATED R AXIS, ECG10: 78 DEGREES
CALCULATED T AXIS, ECG11: 3 DEGREES
CALCULATED T AXIS, ECG11: 55 DEGREES
DIAGNOSIS, 93000: NORMAL
DIAGNOSIS, 93000: NORMAL
P-R INTERVAL, ECG05: 118 MS
P-R INTERVAL, ECG05: 48 MS
Q-T INTERVAL, ECG07: 324 MS
Q-T INTERVAL, ECG07: 348 MS
QRS DURATION, ECG06: 138 MS
QRS DURATION, ECG06: 90 MS
QTC CALCULATION (BEZET), ECG08: 396 MS
QTC CALCULATION (BEZET), ECG08: 399 MS
VENTRICULAR RATE, ECG03: 79 BPM
VENTRICULAR RATE, ECG03: 90 BPM

## 2023-03-10 ENCOUNTER — HOSPITAL ENCOUNTER (EMERGENCY)
Age: 23
Discharge: HOME | End: 2023-03-11
Attending: STUDENT IN AN ORGANIZED HEALTH CARE EDUCATION/TRAINING PROGRAM
Payer: COMMERCIAL

## 2023-03-10 VITALS
HEIGHT: 65 IN | TEMPERATURE: 98.1 F | WEIGHT: 146 LBS | DIASTOLIC BLOOD PRESSURE: 76 MMHG | OXYGEN SATURATION: 100 % | HEART RATE: 107 BPM | RESPIRATION RATE: 16 BRPM | BODY MASS INDEX: 24.32 KG/M2 | SYSTOLIC BLOOD PRESSURE: 135 MMHG

## 2023-03-10 DIAGNOSIS — K21.9 GASTROESOPHAGEAL REFLUX DISEASE WITHOUT ESOPHAGITIS: ICD-10-CM

## 2023-03-10 DIAGNOSIS — F41.1 ANXIETY STATE: ICD-10-CM

## 2023-03-10 DIAGNOSIS — M79.10 MUSCLE TENSION PAIN: Primary | ICD-10-CM

## 2023-03-10 DIAGNOSIS — M54.12 CERVICAL RADICULOPATHY: ICD-10-CM

## 2023-03-10 LAB
BASOPHILS # BLD: 0.1 K/UL (ref 0–0.1)
BASOPHILS NFR BLD: 2 % (ref 0–1)
DIFFERENTIAL METHOD BLD: ABNORMAL
EOSINOPHIL # BLD: 0.1 K/UL (ref 0–0.4)
EOSINOPHIL NFR BLD: 1 % (ref 0–7)
ERYTHROCYTE [DISTWIDTH] IN BLOOD BY AUTOMATED COUNT: 11.8 % (ref 11.5–14.5)
HCT VFR BLD AUTO: 45.6 % (ref 36.6–50.3)
HGB BLD-MCNC: 15.6 G/DL (ref 12.1–17)
IMM GRANULOCYTES # BLD AUTO: 0 K/UL (ref 0–0.04)
IMM GRANULOCYTES NFR BLD AUTO: 0 % (ref 0–0.5)
LYMPHOCYTES # BLD: 1.8 K/UL (ref 0.8–3.5)
LYMPHOCYTES NFR BLD: 33 % (ref 12–49)
MCH RBC QN AUTO: 32.2 PG (ref 26–34)
MCHC RBC AUTO-ENTMCNC: 34.2 G/DL (ref 30–36.5)
MCV RBC AUTO: 94.2 FL (ref 80–99)
MONOCYTES # BLD: 0.4 K/UL (ref 0–1)
MONOCYTES NFR BLD: 8 % (ref 5–13)
NEUTS SEG # BLD: 3 K/UL (ref 1.8–8)
NEUTS SEG NFR BLD: 56 % (ref 32–75)
NRBC # BLD: 0 K/UL (ref 0–0.01)
NRBC BLD-RTO: 0 PER 100 WBC
PLATELET # BLD AUTO: 309 K/UL (ref 150–400)
PMV BLD AUTO: 9.2 FL (ref 8.9–12.9)
RBC # BLD AUTO: 4.84 M/UL (ref 4.1–5.7)
WBC # BLD AUTO: 5.4 K/UL (ref 4.1–11.1)

## 2023-03-10 PROCEDURE — 85025 COMPLETE CBC W/AUTO DIFF WBC: CPT

## 2023-03-10 PROCEDURE — 81001 URINALYSIS AUTO W/SCOPE: CPT

## 2023-03-10 PROCEDURE — 80053 COMPREHEN METABOLIC PANEL: CPT

## 2023-03-10 PROCEDURE — 99283 EMERGENCY DEPT VISIT LOW MDM: CPT

## 2023-03-10 PROCEDURE — 84443 ASSAY THYROID STIM HORMONE: CPT

## 2023-03-10 PROCEDURE — 74011250637 HC RX REV CODE- 250/637: Performed by: STUDENT IN AN ORGANIZED HEALTH CARE EDUCATION/TRAINING PROGRAM

## 2023-03-10 RX ORDER — HYDROXYZINE 25 MG/1
50 TABLET, FILM COATED ORAL
Status: DISCONTINUED | OUTPATIENT
Start: 2023-03-10 | End: 2023-03-11

## 2023-03-10 RX ORDER — KETOROLAC TROMETHAMINE 30 MG/ML
15 INJECTION, SOLUTION INTRAMUSCULAR; INTRAVENOUS
Status: DISCONTINUED | OUTPATIENT
Start: 2023-03-10 | End: 2023-03-11 | Stop reason: HOSPADM

## 2023-03-11 LAB
ALBUMIN SERPL-MCNC: 4.6 G/DL (ref 3.5–5)
ALBUMIN/GLOB SERPL: 1.3 (ref 1.1–2.2)
ALP SERPL-CCNC: 62 U/L (ref 45–117)
ALT SERPL-CCNC: 17 U/L (ref 12–78)
ANION GAP SERPL CALC-SCNC: 11 MMOL/L (ref 5–15)
APPEARANCE UR: CLEAR
AST SERPL-CCNC: 14 U/L (ref 15–37)
BACTERIA URNS QL MICRO: NEGATIVE /HPF
BILIRUB SERPL-MCNC: 0.6 MG/DL (ref 0.2–1)
BILIRUB UR QL: NEGATIVE
BUN SERPL-MCNC: 11 MG/DL (ref 6–20)
BUN/CREAT SERPL: 10 (ref 12–20)
CALCIUM SERPL-MCNC: 9.4 MG/DL (ref 8.5–10.1)
CHLORIDE SERPL-SCNC: 102 MMOL/L (ref 97–108)
CO2 SERPL-SCNC: 26 MMOL/L (ref 21–32)
COLOR UR: NORMAL
CREAT SERPL-MCNC: 1.12 MG/DL (ref 0.7–1.3)
EPITH CASTS URNS QL MICRO: NORMAL /LPF
GLOBULIN SER CALC-MCNC: 3.6 G/DL (ref 2–4)
GLUCOSE SERPL-MCNC: 92 MG/DL (ref 65–100)
GLUCOSE UR STRIP.AUTO-MCNC: NEGATIVE MG/DL
HGB UR QL STRIP: NEGATIVE
KETONES UR QL STRIP.AUTO: NEGATIVE MG/DL
LEUKOCYTE ESTERASE UR QL STRIP.AUTO: NEGATIVE
NITRITE UR QL STRIP.AUTO: NEGATIVE
PH UR STRIP: 8 (ref 5–8)
POTASSIUM SERPL-SCNC: 4.1 MMOL/L (ref 3.5–5.1)
PROT SERPL-MCNC: 8.2 G/DL (ref 6.4–8.2)
PROT UR STRIP-MCNC: NEGATIVE MG/DL
RBC #/AREA URNS HPF: NORMAL /HPF (ref 0–5)
SODIUM SERPL-SCNC: 139 MMOL/L (ref 136–145)
SP GR UR REFRACTOMETRY: <1.005
TSH SERPL DL<=0.05 MIU/L-ACNC: 3.61 UIU/ML (ref 0.36–3.74)
UA: UC IF INDICATED,UAUC: NORMAL
UROBILINOGEN UR QL STRIP.AUTO: 0.2 EU/DL (ref 0.2–1)
WBC URNS QL MICRO: NORMAL /HPF (ref 0–4)

## 2023-03-11 PROCEDURE — 74011250637 HC RX REV CODE- 250/637: Performed by: STUDENT IN AN ORGANIZED HEALTH CARE EDUCATION/TRAINING PROGRAM

## 2023-03-11 RX ORDER — HYDROXYZINE 25 MG/1
25 TABLET, FILM COATED ORAL
Qty: 21 TABLET | Refills: 0 | Status: SHIPPED | OUTPATIENT
Start: 2023-03-11 | End: 2023-03-18

## 2023-03-11 RX ORDER — HYDROXYZINE 25 MG/1
25 TABLET, FILM COATED ORAL
Status: COMPLETED | OUTPATIENT
Start: 2023-03-11 | End: 2023-03-11

## 2023-03-11 RX ADMIN — HYDROXYZINE HYDROCHLORIDE 25 MG: 25 TABLET, FILM COATED ORAL at 00:24

## 2023-03-11 NOTE — ED TRIAGE NOTES
Chief Complaint   Patient presents with    Shoulder Pain    Numbness     Patient presents to the ED ambulatory c/o bilateral shoulder pains and numbness from his posterior neck down into his left lower back x 3-4 months. Notes that he was working at [employer] when symptoms started. Denies any known injuries. Patient also c/o sometimes feeling lightheaded.

## 2023-03-11 NOTE — ED PROVIDER NOTES
[facility] EMERGENCY DEPT  EMERGENCY DEPARTMENT ENCOUNTER       Pt Name: Antonio Gifford  MRN: [id number]  [place] 2000  Date of evaluation: 3/10/2023  Provider: Dakota Atkinson MD   PCP: None  Note Started: 11:25 PM 3/10/23     CHIEF COMPLAINT       Chief Complaint   Patient presents with    Shoulder Pain    Numbness        HISTORY OF PRESENT ILLNESS: 1 or more elements      History From: patient, History limited by: none     Andry Concepcion is a 26 y.o. male presenting with multiple complaints. He notes BL shoulder tightness, numbness. Lower back BL numbness that comes and goes, trouble taking deep breaths, epigastric pain, trouble swallowing, \"back feeling when I pee\". He ntoes these symptoms have been going on mostly for a week, but notes some of them for much longer. Notes he went to [facility] recently for his back symptoms and got an xray and was told everything was ok. Denies fever. Denies HA, dizziness. Denies smoking, drinking or drugs. Please See MDM for Additional Details of the HPI/PMH  Nursing Notes were all reviewed and agreed with or any disagreements were addressed in the HPI. REVIEW OF SYSTEMS        Positives and Pertinent negatives as per HPI.     PAST HISTORY     Past Medical History:  Past Medical History:   Diagnosis Date    ADHD (attention deficit hyperactivity disorder) 2019    Depression     Second hand smoke exposure        Past Surgical History:  Past Surgical History:   Procedure Laterality Date    HX CIRCUMCISION          HX OTHER SURGICAL      Laceration repair, left leg, [facility], 10 yrs old       Family History:  Family History   Problem Relation Age of Onset    Anxiety Mother     Depression Mother     Seizures Father     Diabetes Paternal Grandmother     Hypertension Paternal Grandmother     Hypertension Paternal Great Grandmother     Depression Maternal Grandmother     No Known Problems Brother     Heart Disease Maternal Grandfather     Emphysema Paternal Grandfather        Social History:  Social History     Tobacco Use    Smoking status: Passive Smoke Exposure - Never Smoker    Smokeless tobacco: Never   Substance Use Topics    Alcohol use: Never    Drug use: Never       Allergies:  No Known Allergies    CURRENT MEDICATIONS      Previous Medications    NAPROXEN (NAPROSYN) 500 MG TABLET    Take 1 Tablet by mouth two (2) times daily (with meals). SCREENINGS               No data recorded         PHYSICAL EXAM      ED Triage Vitals [03/10/23 2307]   ED Encounter Vitals Group      /76      Pulse (Heart Rate) (!) 107      Resp Rate 16      Temp 98.1 °F (36.7 °C)      Temp src       O2 Sat (%) 100 %      Weight 146 lb      Height 5' 5\"        Physical Exam  Vitals and nursing note reviewed. Constitutional:       General: He is not in acute distress. Appearance: He is not toxic-appearing. Comments: tremulous   HENT:      Head: Normocephalic and atraumatic. Eyes:      Extraocular Movements: Extraocular movements intact. Pupils: Pupils are equal, round, and reactive to light. Cardiovascular:      Rate and Rhythm: Normal rate and regular rhythm. Pulmonary:      Effort: Pulmonary effort is normal.      Breath sounds: Normal breath sounds. Abdominal:      General: Abdomen is flat. There is no distension. Palpations: Abdomen is soft. Tenderness: There is no abdominal tenderness. Musculoskeletal:         General: No swelling or deformity. Normal range of motion. Cervical back: Normal range of motion. No rigidity or tenderness. Comments: Clubbed nails   Skin:     General: Skin is warm. Capillary Refill: Capillary refill takes less than 2 seconds. Coloration: Skin is pale. Neurological:      General: No focal deficit present. Mental Status: He is alert and oriented to person, place, and time. Cranial Nerves: No cranial nerve deficit. Sensory: No sensory deficit. Motor: No weakness. Coordination: Coordination normal.      Gait: Gait normal.   Psychiatric:      Comments: anxious        DIAGNOSTIC RESULTS   LABS:     Recent Results (from the past 12 hour(s))   CBC WITH AUTOMATED DIFF    Collection Time: 03/10/23 11:29 PM   Result Value Ref Range    WBC 5.4 4.1 - 11.1 K/uL    RBC 4.84 4.10 - 5.70 M/uL    HGB 15.6 12.1 - 17.0 g/dL    HCT 45.6 36.6 - 50.3 %    MCV 94.2 80.0 - 99.0 FL    MCH 32.2 26.0 - 34.0 PG    MCHC 34.2 30.0 - 36.5 g/dL    RDW 11.8 11.5 - 14.5 %    PLATELET 446 717 - 136 K/uL    MPV 9.2 8.9 - 12.9 FL    NRBC 0.0 0  WBC    ABSOLUTE NRBC 0.00 0.00 - 0.01 K/uL    NEUTROPHILS 56 32 - 75 %    LYMPHOCYTES 33 12 - 49 %    MONOCYTES 8 5 - 13 %    EOSINOPHILS 1 0 - 7 %    BASOPHILS 2 (H) 0 - 1 %    IMMATURE GRANULOCYTES 0 0.0 - 0.5 %    ABS. NEUTROPHILS 3.0 1.8 - 8.0 K/UL    ABS. LYMPHOCYTES 1.8 0.8 - 3.5 K/UL    ABS. MONOCYTES 0.4 0.0 - 1.0 K/UL    ABS. EOSINOPHILS 0.1 0.0 - 0.4 K/UL    ABS. BASOPHILS 0.1 0.0 - 0.1 K/UL    ABS. IMM. GRANS. 0.0 0.00 - 0.04 K/UL    DF AUTOMATED     METABOLIC PANEL, COMPREHENSIVE    Collection Time: 03/10/23 11:29 PM   Result Value Ref Range    Sodium 139 136 - 145 mmol/L    Potassium 4.1 3.5 - 5.1 mmol/L    Chloride 102 97 - 108 mmol/L    CO2 26 21 - 32 mmol/L    Anion gap 11 5 - 15 mmol/L    Glucose 92 65 - 100 mg/dL    BUN 11 6 - 20 MG/DL    Creatinine 1.12 0.70 - 1.30 MG/DL    BUN/Creatinine ratio 10 (L) 12 - 20      eGFR >60 >60 ml/min/1.73m2    Calcium 9.4 8.5 - 10.1 MG/DL    Bilirubin, total 0.6 0.2 - 1.0 MG/DL    ALT (SGPT) 17 12 - 78 U/L    AST (SGOT) 14 (L) 15 - 37 U/L    Alk.  phosphatase 62 45 - 117 U/L    Protein, total 8.2 6.4 - 8.2 g/dL    Albumin 4.6 3.5 - 5.0 g/dL    Globulin 3.6 2.0 - 4.0 g/dL    A-G Ratio 1.3 1.1 - 2.2     TSH 3RD GENERATION    Collection Time: 03/10/23 11:29 PM   Result Value Ref Range    TSH 3.61 0.36 - 3.74 uIU/mL   URINALYSIS W/ REFLEX CULTURE    Collection Time: 03/10/23 11:58 PM    Specimen: Urine   Result Value Ref Range    Color YELLOW/STRAW      Appearance CLEAR CLEAR      Specific gravity <1.005     pH (UA) 8.0 5.0 - 8.0      Protein Negative NEG mg/dL    Glucose Negative NEG mg/dL    Ketone Negative NEG mg/dL    Bilirubin Negative NEG      Blood Negative NEG      Urobilinogen 0.2 0.2 - 1.0 EU/dL    Nitrites Negative NEG      Leukocyte Esterase Negative NEG      WBC 0-4 0 - 4 /hpf    RBC 0-5 0 - 5 /hpf    Epithelial cells FEW FEW /lpf    Bacteria Negative NEG /hpf    UA:UC IF INDICATED CULTURE NOT INDICATED BY UA RESULT CNI          EKG: If performed, independent interpretation documented below in the MDM section     RADIOLOGY:  Non-plain film images such as CT, Ultrasound and MRI are read by the radiologist. Plain radiographic images are visualized and preliminarily interpreted by the ED Provider with the findings documented in the MDM section. Interpretation per the Radiologist below, if available at the time of this note:     No results found. PROCEDURES   Unless otherwise noted below, none  Procedures     CRITICAL CARE TIME   none    EMERGENCY DEPARTMENT COURSE and DIFFERENTIAL DIAGNOSIS/MDM   Vitals:    Vitals:    03/10/23 2307   BP: 135/76   Pulse: (!) 107   Resp: 16   Temp: 98.1 °F (36.7 °C)   SpO2: 100%   Weight: 66.2 kg (146 lb)   Height: 5' 5\" (1.651 m)        Patient was given the following medications:  Medications   ketorolac (TORADOL) injection 15 mg (15 mg IntraMUSCular Refused 3/10/23 2328)   hydrOXYzine HCL (ATARAX) tablet 25 mg (25 mg Oral Given 3/11/23 0024)       Medical Decision Making  23yoM with PMH of anxiety presents with multiple complaints. He vitals initially with mild tachycardia on arrival but on my exam approximately 95 BPM.  On exam, he is tremulous, shaking hands BL - when asked why, he notes \"that's normal for me\". Denies being anxious but appears very much so. He has a normal neuro exam, strong in extremities, able to jump on both feet.   He can feel all dermatomes on his back. Neck is soft, full ROM. Notable is the clubbing in his finger - he notes that from his \"anemia\". Back numbness - possible radiculopathy, zoster? - he had a normal exam, possibly msk in nature. Considered XR but had one recently so not indicated. Will get UA    Neck tightness/numbness - again normal exam, ddx includes muscle tightness, muscle strain, radiculopathy, anxiety. Will treat with toradol. Swallowing trouble - observed swallowing without difficulty, possibly dry mouth or anxiety \"lump in the throat\". Abdominal pain - epigastric, normal exam, ddx includes gerd, somatic symptom, gers, gastritis. Discussed resumption of his famotidine    Nail spooning - no signs of cyanosis, heart of lung issues. I suspect protein deficiency vs vitamin. Discussed proper diet, vitamins (he was prescribed these two days ago    Case complicated by social determinants of health - notes transpo issues    Reviewed Pediatric outpatient notes - apparently history of similar somatic complaints. Will need pcp follow up upon DC. Amount and/or Complexity of Data Reviewed  Labs: ordered. Risk  Prescription drug management. ED Course as of 03/11/23 0112   Fri Mar 10, 2023   2350 Patient refusing hydroxyzine and toradol. Mother on phone trying to convince him to take them but patient doesn't want to [JS]   Sat Mar 11, 2023   0111 Patient took his hydroxyzine. Not or relaxed. Reviewed his lab work and within normal limits. No signs of UTI or signs of infection. He is not anemic. Discussed his symptoms with his and the importance of having a primary care to follow-up with us for treatment of his anxiety. He notes he is working on that now. He also notes some allergies to his cats. Discussed over-the-counter allergy medication. [JS]      ED Course User Index  [JS] Cheo Cotter MD         FINAL IMPRESSION     1. Muscle tension pain    2. Cervical radiculopathy    3. Gastroesophageal reflux disease without esophagitis    4. Anxiety state          DISPOSITION/PLAN   Lowell DEMARCO Page's  results have been reviewed with him. He has been counseled regarding his diagnosis, treatment, and plan. He verbally conveys understanding and agreement of the signs, symptoms, diagnosis, treatment and prognosis and additionally agrees to follow up as discussed. He also agrees with the care-plan and conveys that all of his questions have been answered. I have also provided discharge instructions for him that include: educational information regarding their diagnosis and treatment, and list of reasons why they would want to return to the ED prior to their follow-up appointment, should his condition change. CLINICAL IMPRESSION    Discharge Note: The patient is stable for discharge home. The signs, symptoms, diagnosis, and discharge instructions have been discussed, understanding conveyed, and agreed upon. The patient is to follow up as recommended or return to ER should their symptoms worsen. PATIENT REFERRED TO:  Follow-up Information       Follow up With Specialties Details Why [address] [facility] EMERGENCY DEPT Emergency Medicine  If symptoms worsen [facility]  [phone]    Please follow up with your PCP for evaluation and treatment for your anxiety                  DISCHARGE MEDICATIONS:  Current Discharge Medication List            DISCONTINUED MEDICATIONS:  Current Discharge Medication List          I am the Primary Clinician of Record. Linh Porras MD (electronically signed)    (Please note that parts of this dictation were completed with voice recognition software. Quite often unanticipated grammatical, syntax, homophones, and other interpretive errors are inadvertently transcribed by the computer software. Please disregards these errors.  Please excuse any errors that have escaped final proofreading.)

## 2023-03-11 NOTE — ED NOTES
Patient appears very anxious and is hesitant about taking prescribed medication. Refusing to take toradol injection stating \" I am not in pain right now and I don't know how these pills will make me feel\"     Patient on the phone with his mother expressing his concerns regarding medication concerns. This RN educated patient and mother about medication benefits/risks and potential side effects but patient declined. MD made aware. Emergency Department Nursing Plan of Care       The Nursing Plan of Care is developed from the Nursing assessment and Emergency Department Attending provider initial evaluation. The plan of care may be reviewed in the ED Provider note.     The Plan of Care was developed with the following considerations:   Patient / Family readiness to learn indicated by:verbalized understanding  Persons(s) to be included in education: patient  Barriers to Learning/Limitations:No    Signed     Gee Rizvi RN    3/10/2023   11:50 PM

## 2023-03-21 ENCOUNTER — HOSPITAL ENCOUNTER (EMERGENCY)
Age: 23
Discharge: HOME | End: 2023-03-21
Attending: EMERGENCY MEDICINE
Payer: COMMERCIAL

## 2023-03-21 VITALS
TEMPERATURE: 98 F | WEIGHT: 145 LBS | RESPIRATION RATE: 16 BRPM | DIASTOLIC BLOOD PRESSURE: 72 MMHG | HEIGHT: 65 IN | BODY MASS INDEX: 24.16 KG/M2 | SYSTOLIC BLOOD PRESSURE: 156 MMHG | OXYGEN SATURATION: 99 % | HEART RATE: 98 BPM

## 2023-03-21 DIAGNOSIS — F41.1 GENERALIZED ANXIETY DISORDER: Primary | ICD-10-CM

## 2023-03-21 PROCEDURE — 99283 EMERGENCY DEPT VISIT LOW MDM: CPT

## 2023-03-21 PROCEDURE — 93005 ELECTROCARDIOGRAM TRACING: CPT

## 2023-03-21 NOTE — ED NOTES
Emergency Department Nursing Plan of Care       The Nursing Plan of Care is developed from the Nursing assessment and Emergency Department Attending provider initial evaluation. The plan of care may be reviewed in the ED Provider note.     The Plan of Care was developed with the following considerations:   Patient / Family readiness to learn indicated by:verbalized understanding  Persons(s) to be included in education: patient  Barriers to Learning/Limitations:No    Signed     Jerrell Goodpasture, RN    3/21/2023   12:38 PM

## 2023-03-21 NOTE — ED PROVIDER NOTES
EMERGENCY DEPARTMENT HISTORY AND PHYSICAL EXAM      Patient Name: Lauryn Concepcion  MRN: [id number]  YOB: 2000    Provider: Sarah Hernández MD  PCP: None     Time/Date of evaluation: 12:39 PM 3/21/23    History of Presenting Illness     Chief Complaint   Patient presents with    Rapid Heart Rate     History Provided By: Patient     History Neelmarcella Chening):   Lauryn Concepcion is a 26 y.o. male with a PMHx of ADHD, anemia, anxiety, and depression  who presents to the emergency department (room 12) by POV C/O palpitations, anxiety, bilateral hand tremors, intermittent diarrhea that is chronic in nature. Patient states the palpitations, shortness of breath, and hand tremors started again this morning approximately 15 minutes after taking a supplemental potassium tablet. He then tried taking his hydroxyzine which normally helps him with his anxiety without any improvement. Patient denies any stimulant use.     Past History     Past Medical History:  Past Medical History:   Diagnosis Date    ADHD (attention deficit hyperactivity disorder) 2019    Anemia     Anxiety     Depression     Second hand smoke exposure        Past Surgical History:  Past Surgical History:   Procedure Laterality Date    HX CIRCUMCISION          HX OTHER SURGICAL      Laceration repair, left leg, [facility], 10 yrs old       Family History:  Family History   Problem Relation Age of Onset    Anxiety Mother     Depression Mother     Seizures Father     Diabetes Paternal Grandmother     Hypertension Paternal Grandmother     Hypertension Paternal Great Grandmother     Depression Maternal Grandmother     No Known Problems Brother     Heart Disease Maternal Grandfather     Emphysema Paternal Grandfather        Social History:  Social History     Tobacco Use    Smoking status: Never     Passive exposure: Yes    Smokeless tobacco: Never   Vaping Use    Vaping Use: Never used   Substance Use Topics    Alcohol use: Never    Drug use: Never Medications: Allergies:  No Known Allergies    Social Determinants of Health:  Social Determinants of Health     Tobacco Use: Medium Risk    Smoking Tobacco Use: Never    Smokeless Tobacco Use: Never    Passive Exposure: Yes   Alcohol Use: Not on file   Financial Resource Strain: Not on file   Food Insecurity: Not on file   Transportation Needs: Not on file   Physical Activity: Not on file   Stress: Not on file   Social Connections: Not on file   Intimate Partner Violence: Not on file   Depression: Not on file   Housing Stability: Not on file       Review of Systems     Review of Systems   Respiratory:  Positive for shortness of breath. Cardiovascular:  Positive for palpitations. Gastrointestinal:  Positive for diarrhea. Neurological:  Positive for tremors. Psychiatric/Behavioral:  The patient is nervous/anxious. All other systems reviewed and are negative. Physical Exam     Patient Vitals for the past 24 hrs:   Temp Pulse Resp BP SpO2   03/21/23 1159 98 °F (36.7 °C) 98 16 (!) 156/72 99 %       Physical Exam  Vitals and nursing note reviewed. Constitutional:       Appearance: Normal appearance. He is well-developed. Comments: Tremulous   HENT:      Head: Normocephalic and atraumatic. Cardiovascular:      Rate and Rhythm: Normal rate and regular rhythm. Pulses: Normal pulses. Heart sounds: Normal heart sounds. Pulmonary:      Effort: Pulmonary effort is normal. No respiratory distress. Breath sounds: Normal breath sounds. Chest:      Chest wall: No tenderness. Abdominal:      General: Bowel sounds are normal.      Palpations: Abdomen is soft. Tenderness: There is no abdominal tenderness. There is no guarding or rebound. Musculoskeletal:      Cervical back: Full passive range of motion without pain, normal range of motion and neck supple. Skin:     General: Skin is warm and dry. Coloration: Skin is pale. Findings: No erythema or rash. Neurological:      Mental Status: He is alert and oriented to person, place, and time. Psychiatric:         Mood and Affect: Mood is anxious. Speech: Speech normal.         Behavior: Behavior normal.         Thought Content: Thought content normal.         Judgment: Judgment normal.       Diagnostic Study Results     Labs:  Recent Results (from the past 12 hour(s))   EKG, 12 LEAD, INITIAL    Collection Time: 03/21/23 12:12 PM   Result Value Ref Range    Ventricular Rate 94 BPM    Atrial Rate 94 BPM    P-R Interval 122 ms    QRS Duration 84 ms    Q-T Interval 346 ms    QTC Calculation (Bezet) 432 ms    Calculated P Axis 81 degrees    Calculated R Axis 69 degrees    Calculated T Axis 48 degrees    Diagnosis       Sinus rhythm with marked sinus arrhythmia  When compared with ECG of 17-FEB-2023 23:56,  RI interval has increased  QRS duration has decreased  Non-specific change in ST segment in Lateral leads  T wave inversion no longer evident in Anterior leads         Procedures     EKG    Date/Time: 3/21/2023 12:18 PM  Performed by: Demian Lopez MD  Authorized by: Demian Lopez MD     ECG reviewed by ED Physician in the absence of a cardiologist: yes    Interpretation:     Interpretation: normal    Rate:     ECG rate:  94    ECG rate assessment: normal    Rhythm:     Rhythm: sinus rhythm    Ectopy:     Ectopy: none    QRS:     QRS axis:  Normal  ST segments:     ST segments:  Normal  T waves:     T waves: normal      ED Course     12:39 PM I Cesar Jo MD) am the first provider for this patient. Initial assessment performed. I reviewed the vital signs, available nursing notes, past medical history, past surgical history, family history and social history. The patients presenting problems have been discussed, and they are in agreement with the care plan formulated and outlined with them. I have encouraged them to ask questions as they arise throughout their visit.     Records Reviewed: Nursing Notes, Old Medical Records, and Previous Laboratory Studies    Cardiac Monitor:  Rate: 98 bpm  Rhythm: Sinus Rhythm    Pulse Oximetry Analysis - 99% on RA    MEDICATIONS ADMINISTERED IN THE ED:  Medications - No data to display    Medical Decision Making     DDX: Generalized anxiety disorder, panic attacks, low suspicion for arrhythmia or substance-induced mood disorder    DISCUSSION:  This appears to be a moderate acute worsening of a chronic condition. 26 y.o. male presents with symptoms consistent with generalized anxiety disorder/panic attacks. Patient's EKG is unremarkable and he just had a full set of blood work done 11 days ago that was unremarkable. Low suspicion for organic etiology of his symptoms and suspect they are due to generalized anxiety/mental health. Discussed patient with [facility] who will provide him outpatient resources for psychiatric services as well as therapy and have him follow-up with outpatient primary care and mental health.  patient agrees with the plan of discharge with follow-up mental health services. I reviewed patient's prescription monitoring program and he has had no prescription for controlled substance in the past 24 months. Additional Considerations:  Considered a formal [facility] evaluation as well as medical screening labs but patient has had multiple medical work-ups recently that have all been negative. Do not feel the patient would benefit from a [facility] evaluation is he does not need inpatient treatment. [facility] will provide outpatient resources for counseling and psychiatric services. Diagnosis and Disposition     DISCHARGE NOTE:  Lowell Concepcion's results have been reviewed with him. He has been counseled regarding his diagnosis, treatment, and plan. He verbally conveys understanding and agreement of the signs, symptoms, diagnosis, treatment and prognosis and additionally agrees to follow up as discussed.  He also agrees with the care-plan and conveys that all of his questions have been answered. I have also provided discharge instructions for him that include: educational information regarding their diagnosis and treatment, and list of reasons why they would want to return to the ED prior to their follow-up appointment, should his condition change. He has been provided with education for proper emergency department utilization. CLINICAL IMPRESSION:    1. Generalized anxiety disorder        PLAN:  1. D/C Home  2. There are no discharge medications for this patient. 3.   Follow-up Information       Follow up With Specialties Details Why [id number]  Schedule an appointment as soon as possible for a visit   [place]     [address] [facility]  Schedule an appointment as soon as possible for a visit   [address] [facility]  [phone]    [facility]  Call   [address]  [facility], [address] [facility] [address]    [address]  Call  to arrange primary care [facility]  [phone]    [address] [place] [address].  Call  to arrange primary care [address] [facility] [address]          Zaina Fernandez MD am the primary clinician of record. Sathya Disclaimer     Please note that this dictation was completed with Arzeda, the computer voice recognition software. Quite often unanticipated grammatical, syntax, homophones, and other interpretive errors are inadvertently transcribed by the computer software. Please disregard these errors. Please excuse any errors that have escaped final proofreading.     Isaac Calero MD

## 2023-03-21 NOTE — ED TRIAGE NOTES
Patient presents to ED for c/o rapid heart rate. Patient states symptoms began after taking potassium pill. Patient took Hydroxyzine with  no relief. Patient appears anxious. Jittery.

## 2023-03-22 LAB
ATRIAL RATE: 94 BPM
CALCULATED P AXIS, ECG09: 81 DEGREES
CALCULATED R AXIS, ECG10: 69 DEGREES
CALCULATED T AXIS, ECG11: 48 DEGREES
DIAGNOSIS, 93000: NORMAL
P-R INTERVAL, ECG05: 122 MS
Q-T INTERVAL, ECG07: 346 MS
QRS DURATION, ECG06: 84 MS
QTC CALCULATION (BEZET), ECG08: 432 MS
VENTRICULAR RATE, ECG03: 94 BPM

## 2023-03-24 ENCOUNTER — HOSPITAL ENCOUNTER (EMERGENCY)
Age: 23
Discharge: HOME | End: 2023-03-24
Attending: EMERGENCY MEDICINE
Payer: COMMERCIAL

## 2023-03-24 VITALS
OXYGEN SATURATION: 100 % | BODY MASS INDEX: 24.16 KG/M2 | SYSTOLIC BLOOD PRESSURE: 115 MMHG | TEMPERATURE: 97.7 F | WEIGHT: 145 LBS | HEART RATE: 95 BPM | DIASTOLIC BLOOD PRESSURE: 70 MMHG | HEIGHT: 65 IN | RESPIRATION RATE: 16 BRPM

## 2023-03-24 DIAGNOSIS — F41.1 ANXIETY STATE: Primary | ICD-10-CM

## 2023-03-24 DIAGNOSIS — R19.7 DIARRHEA, UNSPECIFIED TYPE: ICD-10-CM

## 2023-03-24 PROCEDURE — 96374 THER/PROPH/DIAG INJ IV PUSH: CPT

## 2023-03-24 PROCEDURE — 74011250636 HC RX REV CODE- 250/636: Performed by: EMERGENCY MEDICINE

## 2023-03-24 PROCEDURE — 99284 EMERGENCY DEPT VISIT MOD MDM: CPT

## 2023-03-24 RX ORDER — HYDROXYZINE HYDROCHLORIDE 25 MG/ML
50 INJECTION, SOLUTION INTRAMUSCULAR
Status: DISCONTINUED | OUTPATIENT
Start: 2023-03-24 | End: 2023-03-24

## 2023-03-24 RX ORDER — HYDROXYZINE 50 MG/1
50 TABLET, FILM COATED ORAL
Qty: 20 TABLET | Refills: 0 | Status: SHIPPED | OUTPATIENT
Start: 2023-03-24 | End: 2023-03-31 | Stop reason: SDUPTHER

## 2023-03-24 RX ORDER — DIPHENHYDRAMINE HYDROCHLORIDE 50 MG/ML
50 INJECTION, SOLUTION INTRAMUSCULAR; INTRAVENOUS
Status: COMPLETED | OUTPATIENT
Start: 2023-03-24 | End: 2023-03-24

## 2023-03-24 RX ADMIN — DIPHENHYDRAMINE HYDROCHLORIDE 50 MG: 50 INJECTION, SOLUTION INTRAMUSCULAR; INTRAVENOUS at 06:08

## 2023-03-24 NOTE — ED NOTES
Emergency Department Nursing Plan of Care       The Nursing Plan of Care is developed from the Nursing assessment and Emergency Department Attending provider initial evaluation. The plan of care may be reviewed in the ED Provider note.     The Plan of Care was developed with the following considerations:   Patient / Family readiness to learn indicated by:verbalized understanding  Persons(s) to be included in education: patient  Barriers to Learning/Limitations:No    Signed     Rama Ferreira RN    3/24/2023   6:20 AM

## 2023-03-24 NOTE — ED NOTES
Verbal shift change report given to Tonio Singer RN (oncoming nurse) by Merrick Cervantes RN (offgoing nurse). Report included the following information SBAR, Kardex, ED Summary, MAR, Recent Results and Cardiac Rhythm NSR.

## 2023-03-24 NOTE — ED TRIAGE NOTES
EMS: From home d/t feeling unwell. Pt states he was trying to be seen at [facility] yesterday during the day and it was packed so he did not get seen. Pt was feeling anxious and wanting to be seen for that. Pt states he ran out of his meds (atarax). Pt states he went home and thought he was backed up so he took a laxative at 6pm then had 4 BM's. Pt states he is still feeling anxious.

## 2023-03-24 NOTE — ED NOTES
Patient has had several episodes of hypoxia noted on the monitor. O2Sats dipped to 40% on RA with a good pleth. Patient asleep upon entering room and in no acute distress. Patient awakened by voice each time and O2Sats improve to >95%. Cardiac monitor applied. Pulse ox switched to sticker. MD aware and at bedside to evaluate patient.

## 2023-03-24 NOTE — ED PROVIDER NOTES
[facility] EMERGENCY DEPT  EMERGENCY DEPARTMENT ENCOUNTER       Pt Name: Juan Ramon Cedillo  MRN: [id number]  [place] 2000  Date of evaluation: 3/24/2023  Provider: Shaan Ordonez MD   PCP: None  Note Started: 5:54 AM 3/24/23     CHIEF COMPLAINT       Chief Complaint   Patient presents with    Anxiety        HISTORY OF PRESENT ILLNESS: 1 or more elements      History From: patient, History limited by:  none     Laurel Concepcion is a 26 y.o. male who presents via EMS with chief complaint of anxiety, diarrhea, sensation that he can't take a big breath. Patient explains that he is on Atarax but ran out. He went to [facility] but they were taking too long. He developed upper abdominal pain/sensation that he could not take a big breath so he thought he was constipated so He took 4 Dulcolax and has been having diarrhea. He called EMS because he was concerned that his electrolytes were too depleted. Feels he cannot take a big breath. Feels \"empty feeling\" in his stomach. Feels like he might pass out. Feels his ear is clogged. Nursing Notes were all reviewed and agreed with or any disagreements were addressed in the HPI. REVIEW OF SYSTEMS        Positives and Pertinent negatives as per HPI.     PAST HISTORY     Past Medical History:  Past Medical History:   Diagnosis Date    ADHD (attention deficit hyperactivity disorder) 2019    Anemia     Anxiety     Depression     Second hand smoke exposure        Past Surgical History:  Past Surgical History:   Procedure Laterality Date    HX CIRCUMCISION          HX OTHER SURGICAL      Laceration repair, left leg, [facility], 10 yrs old       Family History:  Family History   Problem Relation Age of Onset    Anxiety Mother     Depression Mother     Seizures Father     Diabetes Paternal Grandmother     Hypertension Paternal Grandmother     Hypertension Paternal Great Grandmother     Depression Maternal Grandmother     No Known Problems Brother     Heart Disease Maternal Grandfather     Emphysema Paternal Grandfather        Social History:  Social History     Tobacco Use    Smoking status: Never     Passive exposure: Yes    Smokeless tobacco: Never   Vaping Use    Vaping Use: Never used   Substance Use Topics    Alcohol use: Never    Drug use: Never       Allergies:  No Known Allergies    CURRENT MEDICATIONS      Discharge Medication List as of 3/24/2023  7:43 AM          SCREENINGS               No data recorded         PHYSICAL EXAM      ED Triage Vitals [03/24/23 0541]   ED Encounter Vitals Group      BP (!) 131/94      Pulse (Heart Rate) (!) 112      Resp Rate 20      Temp 97.7 °F (36.5 °C)      Temp src       O2 Sat (%) 98 %      Weight 145 lb      Height 5' 5\"        Physical Exam  Constitutional:       Comments: Visibly anxious and tachypneic. Cardiovascular:      Rate and Rhythm: Tachycardia present. Pulmonary:      Breath sounds: No wheezing or rales. Abdominal:      General: Abdomen is flat. Psychiatric:         Mood and Affect: Mood is anxious. DIAGNOSTIC RESULTS   LABS:     No results found for this or any previous visit (from the past 12 hour(s)). EKG: If performed, independent interpretation documented below in the MDM section     RADIOLOGY:  Non-plain film images such as CT, Ultrasound and MRI are read by the radiologist. Plain radiographic images are visualized and preliminarily interpreted by the ED Provider with the findings documented in the MDM section. Interpretation per the Radiologist below, if available at the time of this note:     No results found.       PROCEDURES   Unless otherwise noted below, none  Procedures       EMERGENCY DEPARTMENT COURSE and DIFFERENTIAL DIAGNOSIS/MDM   Vitals:    Vitals:    03/24/23 0541 03/24/23 0630 03/24/23 0701 03/24/23 0745   BP: (!) 131/94 128/79 117/73 115/70   Pulse: (!) 112  97 95   Resp: 20  16 16   Temp: 97.7 °F (36.5 °C)      SpO2: 98% 100% 100% 100%   Weight: 65.8 kg (145 lb)      Height: 5' 5\" (1.651 m)           Patient was given the following medications:  Medications   diphenhydrAMINE (BENADRYL) injection 50 mg (50 mg IntraVENous Given 3/24/23 0608)       Medical Decision Making  Risk  Prescription drug management. **PLEASE SEE ED COURSE DETAILS BELOW FOR FURTHER MDM DETAILS:         FINAL IMPRESSION     1. Anxiety state    2. Diarrhea, unspecified type          DISPOSITION/PLAN   Lowell A Page's  results have been reviewed with him. He has been counseled regarding his diagnosis, treatment, and plan. He verbally conveys understanding and agreement of the signs, symptoms, diagnosis, treatment and prognosis and additionally agrees to follow up as discussed. He also agrees with the care-plan and conveys that all of his questions have been answered. I have also provided discharge instructions for him that include: educational information regarding their diagnosis and treatment, and list of reasons why they would want to return to the ED prior to their follow-up appointment, should his condition change. PATIENT REFERRED TO:  Follow-up Information       Follow up With Specialties Details Why Contact Info    [facility]  Schedule an appointment as soon as possible for a visit  As needed to establish primary care [address] [place] [address] EMERGENCY DEPT Emergency Medicine   [facility]  [phone]              DISCHARGE MEDICATIONS:  Discharge Medication List as of 3/24/2023  7:43 AM            DISCONTINUED MEDICATIONS:  Discharge Medication List as of 3/24/2023  7:43 AM          I am the Primary Clinician of Record. Thien Looney MD (electronically signed)    (Please note that parts of this dictation were completed with voice recognition software.  Quite often unanticipated grammatical, syntax, homophones, and other interpretive errors are inadvertently transcribed by the computer software. Please disregards these errors.  Please excuse any errors that have escaped final proofreading.)    [

## 2023-03-31 ENCOUNTER — HOSPITAL ENCOUNTER (EMERGENCY)
Age: 23
Discharge: HOME | End: 2023-03-31
Attending: EMERGENCY MEDICINE
Payer: COMMERCIAL

## 2023-03-31 VITALS
HEART RATE: 99 BPM | BODY MASS INDEX: 23.3 KG/M2 | OXYGEN SATURATION: 100 % | HEIGHT: 66 IN | RESPIRATION RATE: 18 BRPM | TEMPERATURE: 98.1 F | WEIGHT: 145 LBS | SYSTOLIC BLOOD PRESSURE: 141 MMHG | DIASTOLIC BLOOD PRESSURE: 109 MMHG

## 2023-03-31 DIAGNOSIS — F41.0 PANIC ATTACK: Primary | ICD-10-CM

## 2023-03-31 DIAGNOSIS — F41.1 ANXIETY STATE: ICD-10-CM

## 2023-03-31 PROCEDURE — 99283 EMERGENCY DEPT VISIT LOW MDM: CPT

## 2023-03-31 RX ORDER — HYDROXYZINE HYDROCHLORIDE 10 MG/1
10 TABLET, FILM COATED ORAL
Status: DISCONTINUED | OUTPATIENT
Start: 2023-03-31 | End: 2023-03-31 | Stop reason: HOSPADM

## 2023-03-31 RX ORDER — HYDROXYZINE 50 MG/1
50 TABLET, FILM COATED ORAL
Qty: 20 TABLET | Refills: 0 | Status: SHIPPED | OUTPATIENT
Start: 2023-03-31 | End: 2023-04-10

## 2023-03-31 NOTE — ED TRIAGE NOTES
PT reports to ER via EMS due to anxiety x tonight. Pt notes hx of anxiety but did not take prescribed atarax. Pt appears very anxious when speaking.

## 2023-03-31 NOTE — ED PROVIDER NOTES
[facility] EMERGENCY DEPT  EMERGENCY DEPARTMENT ENCOUNTER       Pt Name: Dennis Jarvis  MRN: [id number]  [place] 2000  Date of evaluation: 3/31/2023  Provider: Ethel Cantu MD   PCP: None  Note Started: 1:08 AM 3/31/23     CHIEF COMPLAINT       Chief Complaint   Patient presents with    Anxiety        HISTORY OF PRESENT ILLNESS: 1 or more elements      History From: patient, History limited by:  none     Pretty Concepcion is a 26 y.o. male who presents via EMS with chief complaint of anxiety. States he has had problems ongoing for 2 months now. States symptoms began when he got short of breath at work 2 months ago and since then he has had persistent shortness of breath at night, morning headaches, sleep problems. He ran out of his anxiety medication, Atarax. Took his last 1 2 days ago. He states for the last several hours he has felt that he cannot get a big breath     Nursing Notes were all reviewed and agreed with or any disagreements were addressed in the HPI. REVIEW OF SYSTEMS        Positives and Pertinent negatives as per HPI. PAST HISTORY     Past Medical History:  Past Medical History:   Diagnosis Date    ADHD (attention deficit hyperactivity disorder) 2019    Anemia     Anxiety     Depression     Second hand smoke exposure        Past Surgical History:  Past Surgical History:   Procedure Laterality Date    HX CIRCUMCISION      [place]    HX OTHER SURGICAL      Laceration repair, left leg, [facility], 10 yrs old       Family History:  Family History   Problem Relation Age of Onset    Anxiety Mother     Depression Mother     Seizures Father     Diabetes Paternal Grandmother     Hypertension Paternal Grandmother     Hypertension Paternal Great Grandmother     Depression Maternal Grandmother     No Known Problems Brother     Heart Disease Maternal Grandfather     Emphysema Paternal Grandfather        Social History:  Social History     Tobacco Use    Smoking status: Never     Passive exposure:  Yes Smokeless tobacco: Never   Vaping Use    Vaping Use: Never used   Substance Use Topics    Alcohol use: Never    Drug use: Never       Allergies:  No Known Allergies    CURRENT MEDICATIONS      Discharge Medication List as of 3/31/2023  1:33 AM          SCREENINGS               No data recorded         PHYSICAL EXAM      ED Triage Vitals [03/31/23 0101]   ED Encounter Vitals Group      BP (!) 141/109      Pulse (Heart Rate) 99      Resp Rate 18      Temp 98.1 °F (36.7 °C)      Temp src       O2 Sat (%) 100 %      Weight 145 lb      Height 5' 6\"        Physical Exam  Vitals and nursing note reviewed. Constitutional:       Appearance: Normal appearance. He is well-developed. HENT:      Head: Normocephalic and atraumatic. Mouth/Throat:      Mouth: Mucous membranes are moist.   Eyes:      Extraocular Movements: Extraocular movements intact. Conjunctiva/sclera: Conjunctivae normal.   Cardiovascular:      Rate and Rhythm: Normal rate and regular rhythm. Pulmonary:      Effort: Pulmonary effort is normal. No accessory muscle usage or respiratory distress. Abdominal:      General: Abdomen is flat. Palpations: Abdomen is soft. Tenderness: There is no abdominal tenderness. Musculoskeletal:         General: Normal range of motion. Cervical back: Normal range of motion. Skin:     General: Skin is warm and dry. Neurological:      Mental Status: He is alert and oriented to person, place, and time. Psychiatric:         Mood and Affect: Mood is anxious. Behavior: Behavior normal.         Thought Content: Thought content normal.      Comments: Lips quivering, hands shaking, supremely anxious        DIAGNOSTIC RESULTS   LABS:     No results found for this or any previous visit (from the past 12 hour(s)). EKG:  If performed, independent interpretation documented below in the MDM section     RADIOLOGY:  Non-plain film images such as CT, Ultrasound and MRI are read by the radiologist. Mamta Campbell radiographic images are visualized and preliminarily interpreted by the ED Provider with the findings documented in the MDM section. Interpretation per the Radiologist below, if available at the time of this note:     No results found. PROCEDURES   Unless otherwise noted below, none  Procedures       EMERGENCY DEPARTMENT COURSE and DIFFERENTIAL DIAGNOSIS/MDM   Vitals:    Vitals:    03/31/23 0101   BP: (!) 141/109   Pulse: 99   Resp: 18   Temp: 98.1 °F (36.7 °C)   SpO2: 100%   Weight: 65.8 kg (145 lb)   Height: 5' 6\" (1.676 m)        Patient was given the following medications:  Medications   hydrOXYzine HCL (ATARAX) tablet 10 mg (has no administration in time range)       Medical Decision Making  Very limited insight into his own anxiety. I saw this patient last week with the same presentation, and in the interim [facility] Counseled extensively to follow-up with primary care for further eval. Will re-prescribe axiolytic. Risk  Prescription drug management. **PLEASE SEE ED COURSE DETAILS BELOW FOR FURTHER MDM DETAILS:         FINAL IMPRESSION     1. Panic attack    2. Anxiety state          DISPOSITION/PLAN   Lowell A Page's  results have been reviewed with him. He has been counseled regarding his diagnosis, treatment, and plan. He verbally conveys understanding and agreement of the signs, symptoms, diagnosis, treatment and prognosis and additionally agrees to follow up as discussed. He also agrees with the care-plan and conveys that all of his questions have been answered. I have also provided discharge instructions for him that include: educational information regarding their diagnosis and treatment, and list of reasons why they would want to return to the ED prior to their follow-up appointment, should his condition change.         PATIENT REFERRED TO:  Follow-up Information       Follow up With Specialties Details Why Contact [address] [place] [address]  Schedule an appointment as soon as possible for a visit   [facility]  [phone]              DISCHARGE MEDICATIONS:  Discharge Medication List as of 3/31/2023  1:33 AM        CONTINUE these medications which have CHANGED    Details   hydrOXYzine HCL (ATARAX) 50 mg tablet Take 1 Tablet by mouth every six (6) hours as needed for Anxiety for up to 10 days. , Normal, Disp-20 Tablet, R-0               DISCONTINUED MEDICATIONS:  Discharge Medication List as of 3/31/2023  1:33 AM          I am the Primary Clinician of Record. Amisha Espana MD (electronically signed)    (Please note that parts of this dictation were completed with voice recognition software. Quite often unanticipated grammatical, syntax, homophones, and other interpretive errors are inadvertently transcribed by the computer software. Please disregards these errors.  Please excuse any errors that have escaped final proofreading.)

## 2023-03-31 NOTE — ED NOTES
Unable to pull medication from [facility], no meds available in [facility]. ER MD made aware, requesting different medication for pt. Will continue to monitor.